# Patient Record
Sex: MALE | Race: WHITE | NOT HISPANIC OR LATINO | Employment: OTHER | URBAN - METROPOLITAN AREA
[De-identification: names, ages, dates, MRNs, and addresses within clinical notes are randomized per-mention and may not be internally consistent; named-entity substitution may affect disease eponyms.]

---

## 2019-05-12 ENCOUNTER — APPOINTMENT (OUTPATIENT)
Dept: CT IMAGING | Facility: HOSPITAL | Age: 39
End: 2019-05-12

## 2019-05-12 ENCOUNTER — HOSPITAL ENCOUNTER (OUTPATIENT)
Facility: HOSPITAL | Age: 39
Setting detail: OBSERVATION
Discharge: LEFT BEFORE DISCHARGE | End: 2019-05-14
Attending: EMERGENCY MEDICINE | Admitting: HOSPITALIST

## 2019-05-12 DIAGNOSIS — J03.90 TONSILLITIS: Primary | ICD-10-CM

## 2019-05-12 DIAGNOSIS — N28.9 ACUTE RENAL INSUFFICIENCY: ICD-10-CM

## 2019-05-12 DIAGNOSIS — R63.0 ANOREXIA: ICD-10-CM

## 2019-05-12 LAB
ANION GAP SERPL CALCULATED.3IONS-SCNC: 14.9 MMOL/L
BASOPHILS # BLD AUTO: 0.06 10*3/MM3 (ref 0–0.2)
BASOPHILS NFR BLD AUTO: 0.4 % (ref 0–1.5)
BUN BLD-MCNC: 9 MG/DL (ref 6–20)
BUN/CREAT SERPL: 6.4 (ref 7–25)
CALCIUM SPEC-SCNC: 8.6 MG/DL (ref 8.6–10.5)
CHLORIDE SERPL-SCNC: 98 MMOL/L (ref 98–107)
CO2 SERPL-SCNC: 26.1 MMOL/L (ref 22–29)
CREAT BLD-MCNC: 1.4 MG/DL (ref 0.76–1.27)
D-LACTATE SERPL-SCNC: 1.8 MMOL/L (ref 0.5–2)
DEPRECATED RDW RBC AUTO: 44 FL (ref 37–54)
EOSINOPHIL # BLD AUTO: 0.01 10*3/MM3 (ref 0–0.4)
EOSINOPHIL NFR BLD AUTO: 0.1 % (ref 0.3–6.2)
ERYTHROCYTE [DISTWIDTH] IN BLOOD BY AUTOMATED COUNT: 12.2 % (ref 12.3–15.4)
GFR SERPL CREATININE-BSD FRML MDRD: 56 ML/MIN/1.73
GLUCOSE BLD-MCNC: 86 MG/DL (ref 65–99)
HCT VFR BLD AUTO: 52.1 % (ref 37.5–51)
HETEROPH AB SER QL LA: NEGATIVE
HGB BLD-MCNC: 18.4 G/DL (ref 13–17.7)
IMM GRANULOCYTES # BLD AUTO: 0.09 10*3/MM3 (ref 0–0.05)
IMM GRANULOCYTES NFR BLD AUTO: 0.5 % (ref 0–0.5)
LYMPHOCYTES # BLD AUTO: 1.55 10*3/MM3 (ref 0.7–3.1)
LYMPHOCYTES NFR BLD AUTO: 9.2 % (ref 19.6–45.3)
MCH RBC QN AUTO: 34.3 PG (ref 26.6–33)
MCHC RBC AUTO-ENTMCNC: 35.3 G/DL (ref 31.5–35.7)
MCV RBC AUTO: 97.2 FL (ref 79–97)
MONOCYTES # BLD AUTO: 1.93 10*3/MM3 (ref 0.1–0.9)
MONOCYTES NFR BLD AUTO: 11.4 % (ref 5–12)
NEUTROPHILS # BLD AUTO: 13.27 10*3/MM3 (ref 1.7–7)
NEUTROPHILS NFR BLD AUTO: 78.4 % (ref 42.7–76)
NRBC BLD AUTO-RTO: 0 /100 WBC (ref 0–0.2)
PLATELET # BLD AUTO: 186 10*3/MM3 (ref 140–450)
PMV BLD AUTO: 11.4 FL (ref 6–12)
POTASSIUM BLD-SCNC: 3.6 MMOL/L (ref 3.5–5.2)
PROCALCITONIN SERPL-MCNC: 0.11 NG/ML (ref 0.1–0.25)
RBC # BLD AUTO: 5.36 10*6/MM3 (ref 4.14–5.8)
S PYO AG THROAT QL: NEGATIVE
SODIUM BLD-SCNC: 139 MMOL/L (ref 136–145)
WBC NRBC COR # BLD: 16.91 10*3/MM3 (ref 3.4–10.8)

## 2019-05-12 PROCEDURE — 25010000002 PIPERACILLIN SOD-TAZOBACTAM PER 1 G: Performed by: EMERGENCY MEDICINE

## 2019-05-12 PROCEDURE — 80048 BASIC METABOLIC PNL TOTAL CA: CPT | Performed by: EMERGENCY MEDICINE

## 2019-05-12 PROCEDURE — 96361 HYDRATE IV INFUSION ADD-ON: CPT

## 2019-05-12 PROCEDURE — 25010000002 DEXAMETHASONE PER 1 MG: Performed by: EMERGENCY MEDICINE

## 2019-05-12 PROCEDURE — 36415 COLL VENOUS BLD VENIPUNCTURE: CPT | Performed by: EMERGENCY MEDICINE

## 2019-05-12 PROCEDURE — 87081 CULTURE SCREEN ONLY: CPT | Performed by: EMERGENCY MEDICINE

## 2019-05-12 PROCEDURE — 87040 BLOOD CULTURE FOR BACTERIA: CPT | Performed by: EMERGENCY MEDICINE

## 2019-05-12 PROCEDURE — 83605 ASSAY OF LACTIC ACID: CPT | Performed by: EMERGENCY MEDICINE

## 2019-05-12 PROCEDURE — 86308 HETEROPHILE ANTIBODY SCREEN: CPT | Performed by: EMERGENCY MEDICINE

## 2019-05-12 PROCEDURE — 87880 STREP A ASSAY W/OPTIC: CPT | Performed by: EMERGENCY MEDICINE

## 2019-05-12 PROCEDURE — 25010000002 IOPAMIDOL 61 % SOLUTION: Performed by: EMERGENCY MEDICINE

## 2019-05-12 PROCEDURE — 96365 THER/PROPH/DIAG IV INF INIT: CPT

## 2019-05-12 PROCEDURE — 96375 TX/PRO/DX INJ NEW DRUG ADDON: CPT

## 2019-05-12 PROCEDURE — 84145 PROCALCITONIN (PCT): CPT | Performed by: EMERGENCY MEDICINE

## 2019-05-12 PROCEDURE — G0378 HOSPITAL OBSERVATION PER HR: HCPCS

## 2019-05-12 PROCEDURE — 99284 EMERGENCY DEPT VISIT MOD MDM: CPT

## 2019-05-12 PROCEDURE — 85025 COMPLETE CBC W/AUTO DIFF WBC: CPT | Performed by: EMERGENCY MEDICINE

## 2019-05-12 PROCEDURE — 70491 CT SOFT TISSUE NECK W/DYE: CPT

## 2019-05-12 RX ORDER — SODIUM CHLORIDE 9 MG/ML
75 INJECTION, SOLUTION INTRAVENOUS CONTINUOUS
Status: DISCONTINUED | OUTPATIENT
Start: 2019-05-12 | End: 2019-05-13

## 2019-05-12 RX ORDER — PANTOPRAZOLE SODIUM 40 MG/1
40 TABLET, DELAYED RELEASE ORAL
Status: DISCONTINUED | OUTPATIENT
Start: 2019-05-13 | End: 2019-05-14 | Stop reason: HOSPADM

## 2019-05-12 RX ADMIN — DEXAMETHASONE SODIUM PHOSPHATE 10 MG: 10 INJECTION INTRAMUSCULAR; INTRAVENOUS at 16:39

## 2019-05-12 RX ADMIN — TAZOBACTAM SODIUM AND PIPERACILLIN SODIUM 3.38 G: 375; 3 INJECTION, SOLUTION INTRAVENOUS at 17:37

## 2019-05-12 RX ADMIN — TAZOBACTAM SODIUM AND PIPERACILLIN SODIUM 3.38 G: 375; 3 INJECTION, SOLUTION INTRAVENOUS at 23:55

## 2019-05-12 RX ADMIN — SODIUM CHLORIDE 1000 ML: 9 INJECTION, SOLUTION INTRAVENOUS at 16:43

## 2019-05-12 RX ADMIN — IOPAMIDOL 75 ML: 612 INJECTION, SOLUTION INTRAVENOUS at 17:21

## 2019-05-12 RX ADMIN — SODIUM CHLORIDE 125 ML/HR: 9 INJECTION, SOLUTION INTRAVENOUS at 16:44

## 2019-05-12 NOTE — ED PROVIDER NOTES
EMERGENCY DEPARTMENT ENCOUNTER    CHIEF COMPLAINT  Chief Complaint: sore throat  History given by: patient and patient's wife  History limited by: none  Room Number: 18/18  PMD: System, Provider Not In      HPI:  Pt is a 38 y.o. male who presents complaining of sore throat that has been worsening for the past two days. Pt reports that he was seen at [facility] yesterday due to chills, diaphoresis, fever, and sore throat. He had a strep swap that was negative and he was advised to return if his Sx worsened. Today the pt began to develop nausea, vomiting, generalized myalgias, trouble swallowing (due to pain), and decreased appetite.  He has been eating and drinking very little the past 2 days.  It was also noted that while at the [facility] the pt was HTN. Pt says that he has not seen a doctor in nearly 6 years and has not been previously Dx with HTN. Denies cough, SOA, rashes, and any other complaints at this time. Pt denies smoking, but admits to occasional EtOH use. He was recently on a cruise and is unsure of ill contact. Pt took tylenol PTA.    Duration:  Two days  Onset: gradual  Timing: constant  Location: throat  Radiation: none  Quality: sore throat  Intensity/Severity: moderate  Progression: worsened  Associated Symptoms: chills, diaphoresis, fever, sore throat, nausea, vomiting, generalized myalgias, trouble swallowing (due to pain), and decreased appetite  Aggravating Factors: swallowing  Alleviating Factors: none  Previous Episodes: none  Treatment before arrival: Pt took tylenol PTA.     PAST MEDICAL HISTORY  Active Ambulatory Problems     Diagnosis Date Noted   • No Active Ambulatory Problems     Resolved Ambulatory Problems     Diagnosis Date Noted   • No Resolved Ambulatory Problems     No Additional Past Medical History       PAST SURGICAL HISTORY  History reviewed. No pertinent surgical history.    FAMILY HISTORY  History reviewed. No pertinent family history.    SOCIAL HISTORY  Social History     Socioeconomic  History   • Marital status:      Spouse name: Not on file   • Number of children: Not on file   • Years of education: Not on file   • Highest education level: Not on file   Tobacco Use   • Smoking status: Current Some Day Smoker     Types: Electronic Cigarette   Substance and Sexual Activity   • Alcohol use: Yes     Alcohol/week: 0.6 oz     Types: 1 Cans of beer per week   • Drug use: No   • Sexual activity: Defer       ALLERGIES  Patient has no known allergies.    REVIEW OF SYSTEMS  Review of Systems   Constitutional: Positive for appetite change (decreased), chills, diaphoresis and fever. Negative for activity change.   HENT: Positive for sore throat and trouble swallowing (due to pain). Negative for congestion.    Eyes: Negative.    Respiratory: Positive for shortness of breath. Negative for cough.    Cardiovascular: Negative for chest pain and leg swelling.        (+) HTN   Gastrointestinal: Positive for diarrhea and nausea. Negative for abdominal pain and vomiting.   Endocrine: Negative.    Genitourinary: Negative for decreased urine volume and dysuria.   Musculoskeletal: Positive for myalgias (generalized). Negative for neck pain.   Skin: Negative for rash and wound.   Allergic/Immunologic: Negative.    Neurological: Negative for weakness, numbness and headaches.   Hematological: Negative.    Psychiatric/Behavioral: Negative.    All other systems reviewed and are negative.      PHYSICAL EXAM  ED Triage Vitals   Temp Heart Rate Resp BP SpO2   05/12/19 1525 05/12/19 1526 05/12/19 1525 -- 05/12/19 1526   99.7 °F (37.6 °C) 98 18  96 %      Temp src Heart Rate Source Patient Position BP Location FiO2 (%)   05/12/19 1525 05/12/19 1526 -- -- --   Tympanic Monitor          Physical Exam   Constitutional: He is oriented to person, place, and time.  Non-toxic appearance. No distress.   HENT:   Head: Normocephalic and atraumatic.   Mouth/Throat: No trismus in the jaw. Oropharyngeal exudate present. No tonsillar  abscesses.   Patient has significantly enlarged palatine tonsils that almost meet in the midline.  The uvula is midline.  She has exudate on the left palate team tonsil greater than the right.  It is a mild amount of exudate.  Patient has no trismus.  Patient is tolerating oral secretions well.  Patient has no pain in moving the trachea or hyoid bone with palpation.   Eyes: EOM are normal. Pupils are equal, round, and reactive to light.   Neck: Normal range of motion. Neck supple. No tracheal tenderness present. No tracheal deviation present.   Pt is handling he secretions well.    Cardiovascular: Normal rate, regular rhythm, normal heart sounds and intact distal pulses.   HR is in the 90s to the low 100s.    Pulmonary/Chest: Effort normal and breath sounds normal. No stridor. No respiratory distress.   Abdominal: Soft. There is no tenderness. There is no rebound and no guarding.   Musculoskeletal: Normal range of motion. He exhibits no edema.   No BLE edema.   Lymphadenopathy:        Head (right side): Submandibular adenopathy present.        Head (left side): Submandibular adenopathy present.   Neurological: He is alert and oriented to person, place, and time. He has normal sensation and normal strength. No cranial nerve deficit.   Skin: Skin is warm and dry.   Psychiatric: Mood and affect normal.   Nursing note and vitals reviewed.      LAB RESULTS  Lab Results (last 24 hours)     Procedure Component Value Units Date/Time    CBC & Differential [[id number]] Collected:  05/12/19 1549    Specimen:  Blood Updated:  05/12/19 1603    Narrative:       The following orders were created for panel order CBC & Differential.  Procedure                               Abnormality         Status                     ---------                               -----------         ------                     CBC Auto Differential[[id number]]        Abnormal            Final result                 Please view results for these tests on the  "individual orders.    Basic Metabolic Panel [[id number]]  (Abnormal) Collected:  05/12/19 1549    Specimen:  Blood Updated:  05/12/19 1620     Glucose 86 mg/dL      BUN 9 mg/dL      Creatinine 1.40 mg/dL      Sodium 139 mmol/L      Potassium 3.6 mmol/L      Chloride 98 mmol/L      CO2 26.1 mmol/L      Calcium 8.6 mg/dL      eGFR Non African Amer 56 mL/min/1.73      BUN/Creatinine Ratio 6.4     Anion Gap 14.9 mmol/L     Narrative:       GFR Normal >60  Chronic Kidney Disease <60  Kidney Failure <15    Procalcitonin [[id number]]  (Normal) Collected:  05/12/19 1549    Specimen:  Blood Updated:  05/12/19 1626     Procalcitonin 0.11 ng/mL     Narrative:       As a Marker for Sepsis (Non-Neonates):   1. <0.5 ng/mL represents a low risk of severe sepsis and/or septic shock.  1. >2 ng/mL represents a high risk of severe sepsis and/or septic shock.    As a Marker for Lower Respiratory Tract Infections that require antibiotic therapy:  PCT on Admission     Antibiotic Therapy             6-12 Hrs later  > 0.5                Strongly Recommended            >0.25 - <0.5         Recommended  0.1 - 0.25           Discouraged                   Remeasure/reassess PCT  <0.1                 Strongly Discouraged          Remeasure/reassess PCT      As 28 day mortality risk marker: \"Change in Procalcitonin Result\" (> 80 % or <=80 %) if Day 0 (or Day 1) and Day 4 values are available. Refer to http://www.[employer]-pct-calculator.com/   Change in PCT <=80 %   A decrease of PCT levels below or equal to 80 % defines a positive change in PCT test result representing a higher risk for 28-day all-cause mortality of patients diagnosed with severe sepsis or septic shock.  Change in PCT > 80 %   A decrease of PCT levels of more than 80 % defines a negative change in PCT result representing a lower risk for 28-day all-cause mortality of patients diagnosed with severe sepsis or septic shock.                CBC Auto Differential [[id number]]  (Abnormal) " Collected:  05/12/19 1549    Specimen:  Blood Updated:  05/12/19 1603     WBC 16.91 10*3/mm3      RBC 5.36 10*6/mm3      Hemoglobin 18.4 g/dL      Hematocrit 52.1 %      MCV 97.2 fL      MCH 34.3 pg      MCHC 35.3 g/dL      RDW 12.2 %      RDW-SD 44.0 fl      MPV 11.4 fL      Platelets 186 10*3/mm3      Neutrophil % 78.4 %      Lymphocyte % 9.2 %      Monocyte % 11.4 %      Eosinophil % 0.1 %      Basophil % 0.4 %      Immature Grans % 0.5 %      Neutrophils, Absolute 13.27 10*3/mm3      Lymphocytes, Absolute 1.55 10*3/mm3      Monocytes, Absolute 1.93 10*3/mm3      Eosinophils, Absolute 0.01 10*3/mm3      Basophils, Absolute 0.06 10*3/mm3      Immature Grans, Absolute 0.09 10*3/mm3      nRBC 0.0 /100 WBC     Rapid Strep A Screen - Swab, Throat [[id number]]  (Normal) Collected:  05/12/19 1550    Specimen:  Swab from Throat Updated:  05/12/19 1604     Strep A Ag Negative    Beta Strep Culture, Throat - Swab, Throat [[id number]] Collected:  05/12/19 1550    Specimen:  Swab from Throat Updated:  05/12/19 1554    Lactic Acid, Plasma [[id number]]  (Normal) Collected:  05/12/19 1559    Specimen:  Blood Updated:  05/12/19 1625     Lactate 1.8 mmol/L     Mononucleosis Screen [[id number]]  (Normal) Collected:  05/12/19 1559    Specimen:  Blood Updated:  05/12/19 1643     Monospot Negative    Blood Culture - Blood, Arm, Left [[id number]] Collected:  05/12/19 1651    Specimen:  Blood from Arm, Left Updated:  05/12/19 1709    Blood Culture - Blood, Arm, Right [[id number]] Collected:  05/12/19 1659    Specimen:  Blood from Arm, Right Updated:  05/12/19 1709          I ordered the above labs and reviewed the results    RADIOLOGY  CT Soft Tissue Neck With Contrast    (Results Pending)        I ordered the above noted radiological studies. Interpreted by radiologist. Discussed with radiologist (Dr. Johnson). Reviewed by me in PACS.       PROCEDURES  Procedures      PROGRESS AND CONSULTS     1545- Ordered Decadron for pt sore throat, labs,  and CT soft tissue neck.     1611- Ordered Zosyn for pt infection.     1632- Ordered IVF.     1817- Placed call to [facility].     1824- BP- 165/98 HR- 98 Temp- 99.7 °F (37.6 °C) (Tympanic) O2 sat- 93%  Rechecked pt. Pt is resting comfortably. Pt is still handing his oral secretions well and reports some relief of his Sx. Notified pt of his lab and CT soft tissue neck. Discussed the plan to contact [facility] for pt admission. Pt understands and agrees with the plan, all questions answered.    1824- Discussed pt with Dr. Berman ([facility]) who agrees to admit the pt.     MEDICAL DECISION MAKING  Results were reviewed/discussed with the patient and they were also made aware of online access. Pt also made aware that some labs, such as cultures, will not be resulted during ER visit and follow up with PMD is necessary.     MDM  Number of Diagnoses or Management Options     Amount and/or Complexity of Data Reviewed  Clinical lab tests: ordered and reviewed (WBC 16.91)  Tests in the radiology section of CPT®: ordered and reviewed (CT soft tissue neck- Pt has submandibular adenopathy. His tonsils are enlarged with stranding. There appears to be tonsillitis, no epiglottitis, airway compromise, or retropharyngeal abscess. )  Discussion of test results with the performing providers: yes (Dr. Johnson (Radiologist))  Decide to obtain previous medical records or to obtain history from someone other than the patient: yes  Review and summarize past medical records: yes (No prior records available in EPIC)  Discuss the patient with other providers: yes (Dr. Berman ([facility]))  Independent visualization of images, tracings, or specimens: yes    Patient Progress  Patient progress: stable         DIAGNOSIS  Final diagnoses:   Tonsillitis   Anorexia   Acute renal insufficiency       DISPOSITION  ADMISSION    Discussed treatment plan and reason for admission with pt/family and admitting physician.  Pt/family voiced understanding of the plan for admission for  further testing/treatment as needed.     Latest Documented Vital Signs:  As of 6:48 PM  BP- 165/98 HR- 98 Temp- 99.7 °F (37.6 °C) (Tympanic) O2 sat- 93%    --  Documentation assistance provided by love Villalta for Dr. Azeem MD.  Information recorded by the scribe was done at my direction and has been verified and validated by me.     Chuy Villalta  05/12/19 1848       Bney Gann MD  05/12/19 1926

## 2019-05-13 LAB
ANION GAP SERPL CALCULATED.3IONS-SCNC: 13 MMOL/L
B PARAPERT DNA SPEC QL NAA+PROBE: NOT DETECTED
B PERT DNA SPEC QL NAA+PROBE: NOT DETECTED
BASOPHILS # BLD AUTO: 0.02 10*3/MM3 (ref 0–0.2)
BASOPHILS NFR BLD AUTO: 0.1 % (ref 0–1.5)
BUN BLD-MCNC: 9 MG/DL (ref 6–20)
BUN/CREAT SERPL: 8.6 (ref 7–25)
C PNEUM DNA NPH QL NAA+NON-PROBE: NOT DETECTED
CALCIUM SPEC-SCNC: 8.6 MG/DL (ref 8.6–10.5)
CHLORIDE SERPL-SCNC: 102 MMOL/L (ref 98–107)
CO2 SERPL-SCNC: 22 MMOL/L (ref 22–29)
CREAT BLD-MCNC: 1.05 MG/DL (ref 0.76–1.27)
CRP SERPL-MCNC: 4.66 MG/DL (ref 0–0.5)
DEPRECATED RDW RBC AUTO: 40.7 FL (ref 37–54)
EOSINOPHIL # BLD AUTO: 0 10*3/MM3 (ref 0–0.4)
EOSINOPHIL NFR BLD AUTO: 0 % (ref 0.3–6.2)
ERYTHROCYTE [DISTWIDTH] IN BLOOD BY AUTOMATED COUNT: 11.7 % (ref 12.3–15.4)
FLUAV H1 2009 PAND RNA NPH QL NAA+PROBE: NOT DETECTED
FLUAV H1 HA GENE NPH QL NAA+PROBE: NOT DETECTED
FLUAV H3 RNA NPH QL NAA+PROBE: NOT DETECTED
FLUAV SUBTYP SPEC NAA+PROBE: NOT DETECTED
FLUBV RNA ISLT QL NAA+PROBE: NOT DETECTED
GFR SERPL CREATININE-BSD FRML MDRD: 79 ML/MIN/1.73
GLUCOSE BLD-MCNC: 129 MG/DL (ref 65–99)
HADV DNA SPEC NAA+PROBE: NOT DETECTED
HCOV 229E RNA SPEC QL NAA+PROBE: NOT DETECTED
HCOV HKU1 RNA SPEC QL NAA+PROBE: NOT DETECTED
HCOV NL63 RNA SPEC QL NAA+PROBE: NOT DETECTED
HCOV OC43 RNA SPEC QL NAA+PROBE: NOT DETECTED
HCT VFR BLD AUTO: 46 % (ref 37.5–51)
HGB BLD-MCNC: 16.6 G/DL (ref 13–17.7)
HMPV RNA NPH QL NAA+NON-PROBE: NOT DETECTED
HPIV1 RNA SPEC QL NAA+PROBE: NOT DETECTED
HPIV2 RNA SPEC QL NAA+PROBE: NOT DETECTED
HPIV3 RNA NPH QL NAA+PROBE: NOT DETECTED
HPIV4 P GENE NPH QL NAA+PROBE: NOT DETECTED
IMM GRANULOCYTES # BLD AUTO: 0.07 10*3/MM3 (ref 0–0.05)
IMM GRANULOCYTES NFR BLD AUTO: 0.5 % (ref 0–0.5)
LYMPHOCYTES # BLD AUTO: 0.6 10*3/MM3 (ref 0.7–3.1)
LYMPHOCYTES NFR BLD AUTO: 4.3 % (ref 19.6–45.3)
M PNEUMO IGG SER IA-ACNC: NOT DETECTED
MCH RBC QN AUTO: 33.9 PG (ref 26.6–33)
MCHC RBC AUTO-ENTMCNC: 36.1 G/DL (ref 31.5–35.7)
MCV RBC AUTO: 94.1 FL (ref 79–97)
MONOCYTES # BLD AUTO: 0.58 10*3/MM3 (ref 0.1–0.9)
MONOCYTES NFR BLD AUTO: 4.2 % (ref 5–12)
NEUTROPHILS # BLD AUTO: 12.56 10*3/MM3 (ref 1.7–7)
NEUTROPHILS NFR BLD AUTO: 90.9 % (ref 42.7–76)
NRBC BLD AUTO-RTO: 0 /100 WBC (ref 0–0.2)
PLATELET # BLD AUTO: 192 10*3/MM3 (ref 140–450)
PMV BLD AUTO: 11.5 FL (ref 6–12)
POTASSIUM BLD-SCNC: 3.2 MMOL/L (ref 3.5–5.2)
RBC # BLD AUTO: 4.89 10*6/MM3 (ref 4.14–5.8)
RHINOVIRUS RNA SPEC NAA+PROBE: NOT DETECTED
RSV RNA NPH QL NAA+NON-PROBE: NOT DETECTED
SODIUM BLD-SCNC: 137 MMOL/L (ref 136–145)
WBC NRBC COR # BLD: 13.83 10*3/MM3 (ref 3.4–10.8)

## 2019-05-13 PROCEDURE — 96366 THER/PROPH/DIAG IV INF ADDON: CPT

## 2019-05-13 PROCEDURE — 96375 TX/PRO/DX INJ NEW DRUG ADDON: CPT

## 2019-05-13 PROCEDURE — 86140 C-REACTIVE PROTEIN: CPT | Performed by: HOSPITALIST

## 2019-05-13 PROCEDURE — 87581 M.PNEUMON DNA AMP PROBE: CPT | Performed by: HOSPITALIST

## 2019-05-13 PROCEDURE — 87633 RESP VIRUS 12-25 TARGETS: CPT | Performed by: HOSPITALIST

## 2019-05-13 PROCEDURE — 96376 TX/PRO/DX INJ SAME DRUG ADON: CPT

## 2019-05-13 PROCEDURE — 87798 DETECT AGENT NOS DNA AMP: CPT | Performed by: HOSPITALIST

## 2019-05-13 PROCEDURE — 25010000002 HYDRALAZINE PER 20 MG: Performed by: HOSPITALIST

## 2019-05-13 PROCEDURE — G0378 HOSPITAL OBSERVATION PER HR: HCPCS

## 2019-05-13 PROCEDURE — 87486 CHLMYD PNEUM DNA AMP PROBE: CPT | Performed by: HOSPITALIST

## 2019-05-13 PROCEDURE — 25810000003 SODIUM CHLORIDE 0.9 % WITH KCL 20 MEQ 20-0.9 MEQ/L-% SOLUTION: Performed by: HOSPITALIST

## 2019-05-13 PROCEDURE — 85025 COMPLETE CBC W/AUTO DIFF WBC: CPT | Performed by: HOSPITALIST

## 2019-05-13 PROCEDURE — 96361 HYDRATE IV INFUSION ADD-ON: CPT

## 2019-05-13 PROCEDURE — 80048 BASIC METABOLIC PNL TOTAL CA: CPT | Performed by: HOSPITALIST

## 2019-05-13 PROCEDURE — 25010000002 PIPERACILLIN SOD-TAZOBACTAM PER 1 G: Performed by: HOSPITALIST

## 2019-05-13 RX ORDER — AMLODIPINE BESYLATE 5 MG/1
5 TABLET ORAL DAILY
Status: DISCONTINUED | OUTPATIENT
Start: 2019-05-13 | End: 2019-05-13

## 2019-05-13 RX ORDER — SODIUM CHLORIDE AND POTASSIUM CHLORIDE 150; 900 MG/100ML; MG/100ML
75 INJECTION, SOLUTION INTRAVENOUS CONTINUOUS
Status: DISCONTINUED | OUTPATIENT
Start: 2019-05-13 | End: 2019-05-14

## 2019-05-13 RX ORDER — AMLODIPINE BESYLATE 10 MG/1
10 TABLET ORAL ONCE
Status: COMPLETED | OUTPATIENT
Start: 2019-05-14 | End: 2019-05-14

## 2019-05-13 RX ORDER — AMLODIPINE BESYLATE 10 MG/1
10 TABLET ORAL
Status: DISCONTINUED | OUTPATIENT
Start: 2019-05-14 | End: 2019-05-14 | Stop reason: HOSPADM

## 2019-05-13 RX ORDER — AMLODIPINE BESYLATE 10 MG/1
10 TABLET ORAL DAILY
Status: DISCONTINUED | OUTPATIENT
Start: 2019-05-13 | End: 2019-05-13

## 2019-05-13 RX ORDER — AMLODIPINE BESYLATE 10 MG/1
10 TABLET ORAL DAILY
Status: DISCONTINUED | OUTPATIENT
Start: 2019-05-14 | End: 2019-05-13

## 2019-05-13 RX ORDER — ONDANSETRON 2 MG/ML
4 INJECTION INTRAMUSCULAR; INTRAVENOUS EVERY 6 HOURS PRN
Status: DISCONTINUED | OUTPATIENT
Start: 2019-05-13 | End: 2019-05-14

## 2019-05-13 RX ORDER — HYDRALAZINE HYDROCHLORIDE 20 MG/ML
10 INJECTION INTRAMUSCULAR; INTRAVENOUS EVERY 4 HOURS PRN
Status: DISCONTINUED | OUTPATIENT
Start: 2019-05-13 | End: 2019-05-13

## 2019-05-13 RX ADMIN — AMLODIPINE BESYLATE 5 MG: 5 TABLET ORAL at 14:46

## 2019-05-13 RX ADMIN — TAZOBACTAM SODIUM AND PIPERACILLIN SODIUM 3.38 G: 375; 3 INJECTION, SOLUTION INTRAVENOUS at 23:26

## 2019-05-13 RX ADMIN — SODIUM CHLORIDE 125 ML/HR: 9 INJECTION, SOLUTION INTRAVENOUS at 01:46

## 2019-05-13 RX ADMIN — POTASSIUM CHLORIDE AND SODIUM CHLORIDE 75 ML/HR: 900; 150 INJECTION, SOLUTION INTRAVENOUS at 14:46

## 2019-05-13 RX ADMIN — HYDRALAZINE HYDROCHLORIDE 10 MG: 20 INJECTION INTRAMUSCULAR; INTRAVENOUS at 20:09

## 2019-05-13 RX ADMIN — TAZOBACTAM SODIUM AND PIPERACILLIN SODIUM 3.38 G: 375; 3 INJECTION, SOLUTION INTRAVENOUS at 16:39

## 2019-05-13 RX ADMIN — HYDRALAZINE HYDROCHLORIDE 10 MG: 20 INJECTION INTRAMUSCULAR; INTRAVENOUS at 08:56

## 2019-05-13 RX ADMIN — SODIUM CHLORIDE 125 ML/HR: 9 INJECTION, SOLUTION INTRAVENOUS at 08:57

## 2019-05-13 RX ADMIN — TAZOBACTAM SODIUM AND PIPERACILLIN SODIUM 3.38 G: 375; 3 INJECTION, SOLUTION INTRAVENOUS at 09:56

## 2019-05-13 RX ADMIN — PANTOPRAZOLE SODIUM 40 MG: 40 TABLET, DELAYED RELEASE ORAL at 05:01

## 2019-05-13 NOTE — PLAN OF CARE
Problem: Patient Care Overview  Goal: Plan of Care Review  Outcome: Ongoing (interventions implemented as appropriate)   05/13/19 1322   Coping/Psychosocial   Plan of Care Reviewed With patient   Plan of Care Review   Progress improving   OTHER   Outcome Summary IVF NS with 20 kcl at 75 cc/hr; iv abx per order; advance diet to soft regular tolerating; HTN prn BP meds given; norvasc scheduled; up ad erendira Room air, NSR on cardiac monitor       Problem: Pain, Acute (Adult)  Goal: Identify Related Risk Factors and Signs and Symptoms  Outcome: Ongoing (interventions implemented as appropriate)   05/13/19 1322   Pain, Acute (Adult)   Related Risk Factors (Acute Pain) disease process   Signs and Symptoms (Acute Pain) verbalization of pain descriptors     Goal: Acceptable Pain Control/Comfort Level  Outcome: Ongoing (interventions implemented as appropriate)   05/13/19 1322   Pain, Acute (Adult)   Acceptable Pain Control/Comfort Level making progress toward outcome       Problem: Infection, Risk/Actual (Adult)  Goal: Identify Related Risk Factors and Signs and Symptoms  Outcome: Ongoing (interventions implemented as appropriate)   05/13/19 1322   Infection, Risk/Actual (Adult)   Related Risk Factors (Infection, Risk/Actual) other (see comments)  (current infection)   Signs and Symptoms (Infection, Risk/Actual) (pain with swallowing)     Goal: Infection Prevention/Resolution  Outcome: Ongoing (interventions implemented as appropriate)   05/13/19 1322   Infection, Risk/Actual (Adult)   Infection Prevention/Resolution making progress toward outcome

## 2019-05-13 NOTE — PROGRESS NOTES
Discharge Planning Assessment  [facility]     Patient Name: Efren Guillen  MRN: [id number]  Today's Date: 5/13/2019    Admit Date: 5/12/2019    Discharge Needs Assessment     Row Name 05/13/19 1637       Living Environment    Lives With  significant other    Name(s) of Who Lives With Patient  Cherri lucia     Current Living Arrangements  home/apartment/condo    Primary Care Provided by  self    Provides Primary Care For  no one    Family Caregiver if Needed  significant other    Family Caregiver Names  Cherri lucia     Quality of Family Relationships  supportive    Able to Return to Prior Arrangements  yes       Resource/Environmental Concerns    Resource/Environmental Concerns  none    Transportation Concerns  car, none       Transition Planning    Patient/Family Anticipates Transition to  home    Patient/Family Anticipated Services at Transition  none    Transportation Anticipated  family or friend will provide       Discharge Needs Assessment    Readmission Within the Last 30 Days  no previous admission in last 30 days    Concerns to be Addressed  no discharge needs identified    Equipment Currently Used at Home  none    Anticipated Changes Related to Illness  none    Equipment Needed After Discharge  none    Offered/Gave Vendor List  no        Discharge Plan     Row Name 05/13/19 7284       Plan    Plan  Home    Patient/Family in Agreement with Plan  yes    Plan Comments  Met with patient and Cherri araya at bedside, patient granted me permission to speak with him while marshal remains in the room.  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   Patient does not use any special or adaptive equipment.  Patient uses the [facility] [address], denies issues obtaining, affording or taking his medications. Offered Meds to Beds, patient declined offer. Patient does not have POA or living will. Discharged  plans discussed plan is to return home, denies any special needs at this time. Family or friends will transport. Will continue to monitor for new or changing discharge plans.        Destination      No service coordination in this encounter.      Durable Medical Equipment      No service coordination in this encounter.      Dialysis/Infusion      No service coordination in this encounter.      Home Medical Care      No service coordination in this encounter.      Therapy      No service coordination in this encounter.      Community Resources      No service coordination in this encounter.          Demographic Summary     Row Name 05/13/19 1636       General Information    Admission Type  observation    Arrived From  emergency department    Referral Source  admission list    Reason for Consult  discharge planning    Preferred Language  English     Used During This Interaction  no       Contact Information    Permission Granted to Share Info With  family/designee Cherribrenda aj        Functional Status     Row Name 05/13/19 1636       Functional Status    Usual Activity Tolerance  excellent    Current Activity Tolerance  good       Functional Status, IADL    Medications  independent    Meal Preparation  independent       Mental Status    General Appearance WDL  WDL       Mental Status Summary    Recent Changes in Mental Status/Cognitive Functioning  no changes       Employment/    Employment Status  self-employed        Psychosocial    No documentation.       Abuse/Neglect    No documentation.       Legal    No documentation.       Substance Abuse    No documentation.       Patient Forms    No documentation.           Jeannie Alaniz RN

## 2019-05-13 NOTE — PLAN OF CARE
Problem: Patient Care Overview  Goal: Plan of Care Review  Outcome: Ongoing (interventions implemented as appropriate)   05/13/19 1115   Coping/Psychosocial   Plan of Care Reviewed With patient   Plan of Care Review   Progress improving   OTHER   Outcome Summary Patient newly admitted with tonsilitis overnight. Tonsils are red and swollen. Patient does c/o sore throat. IVF's started. IV abx given as ordered. ENT consulted to see today. Labs for the morning. Will continue to monitor.        Problem: Pain, Acute (Adult)  Goal: Acceptable Pain Control/Comfort Level  Outcome: Ongoing (interventions implemented as appropriate)

## 2019-05-13 NOTE — PROGRESS NOTES
"Pharmacokinetic Consult - Zosyn Dosing  Efren Guillen is a 38 y.o. male who is on day 1 pharmacy to dose Zosyn for tonsillitis.  Pharmacy dosing Zosyn per Dr. Berman's request.     Relevant clinical data and objective history reviewed:  177.8 cm (70\")  92.2 kg (203 lb 4.8 oz)  Body mass index is 29.17 kg/m².   He has no past medical history on file.  Allergies as of 05/12/2019   • (No Known Allergies)     Vital Signs (last 24 hours)         05/11 0700  -  05/12 0659 05/12 0700  -  05/12 2128   Most Recent    Temp (°F)   98 -  99.7     98 (36.7)    Heart Rate   87 -  99     87    Resp   16 -  18     18    BP   165/98 -  (!) 188/119     169/99    SpO2 (%)   93 -  98     98            Estimated Creatinine Clearance: 80.9 mL/min (A) (by C-G formula based on SCr of 1.4 mg/dL (H)).  Results from last 7 days   Lab Units 05/12/19  1549   CREATININE mg/dL 1.40*     Results from last 7 days   Lab Units 05/12/19  1549   WBC 10*3/mm3 16.91*       Anti-Infectives (From admission, onward)      Ordered     Dose/Rate Route Frequency Start Stop    05/12/19 2127  piperacillin-tazobactam (ZOSYN) 3.375 g in iso-osmotic dextrose 50 ml (premix)     Ordering Provider:  Giovany Berman MD    3.375 g  over 4 Hours Intravenous Every 8 Hours 05/13/19 0000 05/15/19 2359    05/12/19 1611  piperacillin-tazobactam (ZOSYN) 3.375 g in iso-osmotic dextrose 50 ml (premix)     Ordering Provider:  Beny Gann MD    3.375 g  over 30 Minutes Intravenous Once 05/12/19 1613 05/12/19 1813           Assessment/Plan  Zosyn 3.375 grams iv every 8 hours as per [facility] dosing guidelines as estimated CrCl 80 at this time  Pharmacy will continue to follow daily while on Zosyn and adjust as needed.     Lu Romano, PharmD, BCIDP, BCPS      "

## 2019-05-13 NOTE — CONSULTS
[facility]   ENT CONSULT  2019      Patient Identification:  Name: Efren Guillen  Age: 38 y.o.  Sex: male  :  1980  MRN: [id number]                     Date of Admission: 2019      CC:  Sore throat      HPI: Patient is a 38-year-old male with 3 to 4 days of progressive sore throat and difficulty swallowing.  He has had fever and chills at home with sweats and diarrhea.  He returned from a cruise the week before.  He continued to feel bad and presented to the emergency room yesterday.  He was noted to be dehydrated and IV fluids have been instituted.  Neck CT shows no evidence of abscess but significant swelling of the tonsils.  Patient denies having chronic recurrent tonsil infections, enlargement or sleep problems.  His blood pressure has been significantly elevated.  He has not seen a physician in 6 or 7 years.    ROS:  Review of Systems - History obtained from chart review and the patient     Past Medical History:  History reviewed. No pertinent past medical history.    Past Surgical History:  Past Surgical History:   Procedure Laterality Date   • HERNIA REPAIR      mesh   • SPINE SURGERY      s1 disectomy       Social History:  Social History     Socioeconomic History   • Marital status:      Spouse name: Not on file   • Number of children: Not on file   • Years of education: Not on file   • Highest education level: Not on file   Tobacco Use   • Smoking status: Former Smoker     Types: Electronic Cigarette   • Smokeless tobacco: Never Used   • Tobacco comment: 7-8 years ago   Substance and Sexual Activity   • Alcohol use: Yes     Alcohol/week: 0.6 oz     Types: 1 Cans of beer per week     Comment: occassional   • Drug use: No   • Sexual activity: Defer       Family History:  Family History   Problem Relation Age of Onset   • Hypertension Mother    • Hypertension Father    • Stroke Maternal Grandmother    • Diabetes Niece        Home Meds:  No medications prior  to admission.       Allergies:  No Known Allergies    Immunization Status:    There is no immunization history on file for this patient.      PE:   Temp:  [97.7 °F (36.5 °C)-99.7 °F (37.6 °C)] 97.8 °F (36.6 °C)  Heart Rate:  [87-99] 87  Resp:  [16-18] 18  BP: (160-188)/() 166/106     Body mass index is 29.17 kg/m².      General appearance: alert, well appearing, and in no distress, oriented to person, place, and time and normal appearing weight. No drooling or respiratory distress.  Ability to Communicate: normal means of communication, clear voice, normal hearing  Ears - bilateral TM's and external ear canals normal.  Nasal exam - normal and patent, no erythema, discharge or polyps, clear rhinorrhea, sinuses normal and nontender and straight septum.  Oropharyngeal exam - mucous membranes moist, pharynx normal without lesions, tonsils hypertrophied with exudate and dental hygiene good.  Neck exam - bilateral symmetric anterior adenopathy.  Neurological exam reveals alert, oriented, normal speech, no focal findings or movement disorder noted, cranial nerves II through XII intact.      MEDICATIONS     Current Facility-Administered Medications   Medication Dose Route Frequency Provider Last Rate Last Dose   • hydrALAZINE (APRESOLINE) injection 10 mg  10 mg Intravenous Q4H PRN Giovany Berman MD   10 mg at 05/13/19 0856   • pantoprazole (PROTONIX) EC tablet 40 mg  40 mg Oral Q AM Giovany Berman MD   40 mg at 05/13/19 0501   • piperacillin-tazobactam (ZOSYN) 3.375 g in iso-osmotic dextrose 50 ml (premix)  3.375 g Intravenous Q8H Giovany Berman MD 0 mL/hr at 05/13/19 0343 3.375 g at 05/13/19 0956   • sodium chloride 0.9 % infusion  125 mL/hr Intravenous Continuous Beny Gann  mL/hr at 05/13/19 0857 125 mL/hr at 05/13/19 0857         DATA         Intake/Output Summary (Last 24 hours) at 5/13/2019 1154  Last data filed at 5/13/2019 0900  Gross per 24 hour   Intake 3570 ml   Output --   Net 3570 ml       CBC:    Results from last 7 days   Lab Units 05/13/19  0456   WBC 10*3/mm3 13.83*   RBC 10*6/mm3 4.89     BMP:   Results from last 7 days   Lab Units 05/13/19  0456   GLUCOSE mg/dL 129*   CO2 mmol/L 22.0   BUN mg/dL 9   CREATININE mg/dL 1.05   CALCIUM mg/dL 8.6     Radiology review: No evidence of acute sinusitis.  Tonsils moderately enlarged symmetrically with bilateral cervical lymphadenopathy, no evidence of peritonsillar abscess or neck abscess        Imaging Results (all)     Procedure Component Value Units Date/Time    CT Soft Tissue Neck With Contrast [[id number]] Collected:  05/12/19 1931     Updated:  05/12/19 1931    Narrative:       EMERGENCY CONTRAST-ENHANCED CT SCAN OF THE NECK 05/12/2019     CLINICAL HISTORY: Sore throat and fever.     TECHNIQUE: Spiral CT images were obtained from the tops of petrous  apices down through the sternoclavicular junction following intravenous  contrast and images were reformatted and submitted in 3 mm thick axial  CT sections with soft tissue algorithm, 2 mm thick sagittal and coronal  reconstructions were performed and submitted in soft tissue algorithm.     There are no prior studies from  for  comparison.     FINDINGS: There is a small 20 x 12 x 15 mm mucous retention cyst in the  inferior left maxillary sinus and a 10 x 10 mm mucus retention cyst in  inferior right maxillary sinus. The remainder of the visualized  paranasal sinuses and mastoid air cells and middle ear cavities are  clear. The posterior fossa structures are unremarkable. The nasopharynx  is normal in appearance. There is mild prominence in size, palatine  tonsils. There is slight increased enhancement in the palatine and  lingual tonsils, may be mildly inflamed but there is no abscess.  Otherwise the oropharynx is normal in appearance. There is a right  lateral pharyngeal diverticulum measuring 12 x 12 mm in size that abuts  the right hyoid bone. The remainder of the hypopharynx true  cord  subglottic airway are normal in appearance. The lung apices are clear.  The parotid,  parapharyngeal spaces are symmetric and normal  in appearance. The submandibular glands are normal in appearance. There  is lymphadenopathy in the neck with multiple enlarged suprahyoid level 2  bilateral jugulodigastric chain lymph nodes. The largest left  jugulodigastric chain lymph node measures up to 22 x 23 mm in transverse  dimension. The largest right jugulodigastric chain lymph node measures  19 x 15 mm. Cervical spine is unremarkable.       Impression:       Prominent size palatine-falcial tonsils that have increased  enhancement within them suggesting they are mildly inflamed. No  tonsillar abscess is seen and there is lymphadenopathy in neck with  multiple enlarged bilateral level 2 suprahyoid jugulodigastric chain  lymph nodes. The largest single lymph node in this left suprahyoid  jugulodigastric chain measures up to 2.3 x 2.2 x 3.5 cm in size. These  are probably reactive lymph nodes and likely be neoplastic especially  given the inflamed tonsils but of coarse the findings need to be  correlated clinically and with serial clinical exam. The results of this  study were communicated to Dr. Ledesma in the emergency room by  telephone 05/12/2019 at 6:15 PM.     Radiation dose reduction techniques were utilized, including automated  exposure control and exposure modulation based on body size.                  IMPRESSION     Tonsillitis       Since admission he feels better receiving IV fluids, antibiotics and a one-time dose of Decadron.  He is able to tolerate an oral diet at this time.  There is no evidence of abscess involving the tonsils or within the neck.  I would continue with IV fluids and antibiotics another 24 hours and discharge on oral antibiotic such as Augmentin.  No specific ENT follow-up required at discharge unless symptoms return or progress.        Shakir Parks MD  5/13/2019  11:54  AM

## 2019-05-13 NOTE — H&P
History and physical    Primary care physician  None    Chief complaint  Sore throat cough congestion nausea for last 2 days    History of present illness  38-year-old white male with no medical problems no home medication presented to [facility] emergency room with sore throat fever nausea cough congestion for last to 3 days which is getting worse and developed difficulty swallowing food.  Patient has evaluated in urgent care center due to chills diaphoresis and was advised to return if symptoms worse.  Patient symptoms got worse with decreased appetite due to pain and trouble swallowing and developed general myalgias and evaluated in ER found to have acute severe tonsillitis with dehydration admitted for management    PAST MEDICAL HISTORY  Hypertension     PAST SURGICAL HISTORY  Surgical History   History reviewed. No pertinent surgical history.     FAMILY HISTORY  History reviewed. No pertinent family history.     SOCIAL HISTORY  Social History   Social History            Socioeconomic History   • Marital status:        Spouse name: Not on file   • Number of children: Not on file   • Years of education: Not on file   • Highest education level: Not on file   Tobacco Use   • Smoking status: Current Some Day Smoker       Types: Electronic Cigarette   Substance and Sexual Activity   • Alcohol use: Yes       Alcohol/week: 0.6 oz       Types: 1 Cans of beer per week   • Drug use: No   • Sexual activity: Defer         ALLERGIES  Patient has no known allergies.  No home medication     REVIEW OF SYSTEMS  Review of Systems   Constitutional: Positive for appetite change (decreased), chills, diaphoresis and fever. Negative for activity change.   HENT: Positive for sore throat and trouble swallowing (due to pain). Negative for congestion.    Eyes: Negative.    Respiratory: Positive for shortness of breath. Negative for cough.    Cardiovascular: Negative for chest pain and leg swelling.        (+) HTN  "  Gastrointestinal: Positive for diarrhea and nausea. Negative for abdominal pain and vomiting.   Endocrine: Negative.    Genitourinary: Negative for decreased urine volume and dysuria.   Musculoskeletal: Positive for myalgias (generalized). Negative for neck pain.   Skin: Negative for rash and wound.   Allergic/Immunologic: Negative.    Neurological: Negative for weakness, numbness and headaches.   Hematological: Negative.    Psychiatric/Behavioral: Negative.    All other systems reviewed and are negative.     PHYSICAL EXAM  Blood pressure 143/93, pulse 87, temperature 97.8 °F (36.6 °C), temperature source Oral, resp. rate 18, height 177.8 cm (70\"), weight 92.2 kg (203 lb 4.8 oz), SpO2 98 %.    Constitutional: He is oriented to person, place, and time.  Non-toxic appearance. No distress.   Head: Normocephalic and atraumatic.   Mouth/Throat: No trismus in the jaw. Oropharyngeal exudate present. No tonsillar abscesses.   Patient has significantly enlarged palatine tonsils that almost meet in the midline.  The uvula is midline.  She has exudate on the left palate team tonsil greater than the right.  It is a mild amount of exudate.  Patient has no trismus.  Patient is tolerating oral secretions well.  Patient has no pain in moving the trachea or hyoid bone with palpation.   Eyes: EOM are normal. Pupils are equal, round, and reactive to light.   Neck: Normal range of motion. Neck supple. No tracheal tenderness present. No tracheal deviation present.   Cardiovascular: Normal rate, regular rhythm, normal heart sounds and intact distal pulses.   Pulmonary/Chest: Effort normal and breath sounds normal. No stridor. No respiratory distress.   Abdominal: Soft. There is no tenderness. There is no rebound and no guarding.   Musculoskeletal: Normal range of motion. He exhibits no edema.    Lymphadenopathy: Head (right side): Submandibular adenopathy present. Head (left side): Submandibular adenopathy present.   Neurological: He " is alert and oriented to person, place, and time. He has normal sensation and normal strength. No cranial nerve deficit.   Skin: Skin is warm and dry.   Psychiatric: Mood and affect normal.     LAB RESULTS  Lab Results (last 24 hours)     Procedure Component Value Units Date/Time    Basic Metabolic Panel [[id number]]  (Abnormal) Collected:  05/13/19 0456    Specimen:  Blood Updated:  05/13/19 0559     Glucose 129 mg/dL      BUN 9 mg/dL      Creatinine 1.05 mg/dL      Sodium 137 mmol/L      Potassium 3.2 mmol/L      Chloride 102 mmol/L      CO2 22.0 mmol/L      Calcium 8.6 mg/dL      eGFR Non African Amer 79 mL/min/1.73      BUN/Creatinine Ratio 8.6     Anion Gap 13.0 mmol/L     Narrative:       GFR Normal >60  Chronic Kidney Disease <60  Kidney Failure <15    CBC & Differential [[id number]] Collected:  05/13/19 0456    Specimen:  Blood Updated:  05/13/19 0530    Narrative:       The following orders were created for panel order CBC & Differential.  Procedure                               Abnormality         Status                     ---------                               -----------         ------                     CBC Auto Differential[[id number]]        Abnormal            Final result                 Please view results for these tests on the individual orders.    CBC Auto Differential [[id number]]  (Abnormal) Collected:  05/13/19 0456    Specimen:  Blood Updated:  05/13/19 0530     WBC 13.83 10*3/mm3      RBC 4.89 10*6/mm3      Hemoglobin 16.6 g/dL      Hematocrit 46.0 %      MCV 94.1 fL      MCH 33.9 pg      MCHC 36.1 g/dL      RDW 11.7 %      RDW-SD 40.7 fl      MPV 11.5 fL      Platelets 192 10*3/mm3      Neutrophil % 90.9 %      Lymphocyte % 4.3 %      Monocyte % 4.2 %      Eosinophil % 0.0 %      Basophil % 0.1 %      Immature Grans % 0.5 %      Neutrophils, Absolute 12.56 10*3/mm3      Lymphocytes, Absolute 0.60 10*3/mm3      Monocytes, Absolute 0.58 10*3/mm3      Eosinophils, Absolute 0.00 10*3/mm3       "Basophils, Absolute 0.02 10*3/mm3      Immature Grans, Absolute 0.07 10*3/mm3      nRBC 0.0 /100 WBC     Blood Culture - Blood, Arm, Left [[id number]] Collected:  05/12/19 1651    Specimen:  Blood from Arm, Left Updated:  05/12/19 1709    Blood Culture - Blood, Arm, Right [[id number]] Collected:  05/12/19 1659    Specimen:  Blood from Arm, Right Updated:  05/12/19 1709    Mononucleosis Screen [[id number]]  (Normal) Collected:  05/12/19 1559    Specimen:  Blood Updated:  05/12/19 1643     Monospot Negative    Procalcitonin [[id number]]  (Normal) Collected:  05/12/19 1549    Specimen:  Blood Updated:  05/12/19 1626     Procalcitonin 0.11 ng/mL     Narrative:       As a Marker for Sepsis (Non-Neonates):   1. <0.5 ng/mL represents a low risk of severe sepsis and/or septic shock.  1. >2 ng/mL represents a high risk of severe sepsis and/or septic shock.    As a Marker for Lower Respiratory Tract Infections that require antibiotic therapy:  PCT on Admission     Antibiotic Therapy             6-12 Hrs later  > 0.5                Strongly Recommended            >0.25 - <0.5         Recommended  0.1 - 0.25           Discouraged                   Remeasure/reassess PCT  <0.1                 Strongly Discouraged          Remeasure/reassess PCT      As 28 day mortality risk marker: \"Change in Procalcitonin Result\" (> 80 % or <=80 %) if Day 0 (or Day 1) and Day 4 values are available. Refer to http://[email or web address]-pct-[email or web address]/   Change in PCT <=80 %   A decrease of PCT levels below or equal to 80 % defines a positive change in PCT test result representing a higher risk for 28-day all-cause mortality of patients diagnosed with severe sepsis or septic shock.  Change in PCT > 80 %   A decrease of PCT levels of more than 80 % defines a negative change in PCT result representing a lower risk for 28-day all-cause mortality of patients diagnosed with severe sepsis or septic shock.                Lactic Acid, Plasma [[id number]]  (Normal) " Collected:  05/12/19 1559    Specimen:  Blood Updated:  05/12/19 1625     Lactate 1.8 mmol/L     Basic Metabolic Panel [[id number]]  (Abnormal) Collected:  05/12/19 1549    Specimen:  Blood Updated:  05/12/19 1620     Glucose 86 mg/dL      BUN 9 mg/dL      Creatinine 1.40 mg/dL      Sodium 139 mmol/L      Potassium 3.6 mmol/L      Chloride 98 mmol/L      CO2 26.1 mmol/L      Calcium 8.6 mg/dL      eGFR Non African Amer 56 mL/min/1.73      BUN/Creatinine Ratio 6.4     Anion Gap 14.9 mmol/L     Narrative:       GFR Normal >60  Chronic Kidney Disease <60  Kidney Failure <15    Rapid Strep A Screen - Swab, Throat [[id number]]  (Normal) Collected:  05/12/19 1550    Specimen:  Swab from Throat Updated:  05/12/19 1604     Strep A Ag Negative    CBC & Differential [[id number]] Collected:  05/12/19 1549    Specimen:  Blood Updated:  05/12/19 1603    Narrative:       The following orders were created for panel order CBC & Differential.  Procedure                               Abnormality         Status                     ---------                               -----------         ------                     CBC Auto Differential[[id number]]        Abnormal            Final result                 Please view results for these tests on the individual orders.    CBC Auto Differential [[id number]]  (Abnormal) Collected:  05/12/19 1549    Specimen:  Blood Updated:  05/12/19 1603     WBC 16.91 10*3/mm3      RBC 5.36 10*6/mm3      Hemoglobin 18.4 g/dL      Hematocrit 52.1 %      MCV 97.2 fL      MCH 34.3 pg      MCHC 35.3 g/dL      RDW 12.2 %      RDW-SD 44.0 fl      MPV 11.4 fL      Platelets 186 10*3/mm3      Neutrophil % 78.4 %      Lymphocyte % 9.2 %      Monocyte % 11.4 %      Eosinophil % 0.1 %      Basophil % 0.4 %      Immature Grans % 0.5 %      Neutrophils, Absolute 13.27 10*3/mm3      Lymphocytes, Absolute 1.55 10*3/mm3      Monocytes, Absolute 1.93 10*3/mm3      Eosinophils, Absolute 0.01 10*3/mm3      Basophils, Absolute  0.06 10*3/mm3      Immature Grans, Absolute 0.09 10*3/mm3      nRBC 0.0 /100 WBC     Beta Strep Culture, Throat - Swab, Throat [[id number]] Collected:  05/12/19 1550    Specimen:  Swab from Throat Updated:  05/12/19 1554        Imaging Results (last 24 hours)     Procedure Component Value Units Date/Time    CT Soft Tissue Neck With Contrast [[id number]] Collected:  05/12/19 1931     Updated:  05/12/19 1931    Narrative:       EMERGENCY CONTRAST-ENHANCED CT SCAN OF THE NECK 05/12/2019     CLINICAL HISTORY: Sore throat and fever.     TECHNIQUE: Spiral CT images were obtained from the tops of petrous  apices down through the sternoclavicular junction following intravenous  contrast and images were reformatted and submitted in 3 mm thick axial  CT sections with soft tissue algorithm, 2 mm thick sagittal and coronal  reconstructions were performed and submitted in soft tissue algorithm.     There are no prior studies from [facility] for  comparison.     FINDINGS: There is a small 20 x 12 x 15 mm mucous retention cyst in the  inferior left maxillary sinus and a 10 x 10 mm mucus retention cyst in  inferior right maxillary sinus. The remainder of the visualized  paranasal sinuses and mastoid air cells and middle ear cavities are  clear. The posterior fossa structures are unremarkable. The nasopharynx  is normal in appearance. There is mild prominence in size, palatine  tonsils. There is slight increased enhancement in the palatine and  lingual tonsils, may be mildly inflamed but there is no abscess.  Otherwise the oropharynx is normal in appearance. There is a right  lateral pharyngeal diverticulum measuring 12 x 12 mm in size that abuts  the right hyoid bone. The remainder of the hypopharynx true cord  subglottic airway are normal in appearance. The lung apices are clear.  The parotid,  parapharyngeal spaces are symmetric and normal  in appearance. The submandibular glands are normal in appearance.  There  is lymphadenopathy in the neck with multiple enlarged suprahyoid level 2  bilateral jugulodigastric chain lymph nodes. The largest left  jugulodigastric chain lymph node measures up to 22 x 23 mm in transverse  dimension. The largest right jugulodigastric chain lymph node measures  19 x 15 mm. Cervical spine is unremarkable.       Impression:       Prominent size palatine-falcial tonsils that have increased  enhancement within them suggesting they are mildly inflamed. No  tonsillar abscess is seen and there is lymphadenopathy in neck with  multiple enlarged bilateral level 2 suprahyoid jugulodigastric chain  lymph nodes. The largest single lymph node in this left suprahyoid  jugulodigastric chain measures up to 2.3 x 2.2 x 3.5 cm in size. These  are probably reactive lymph nodes and likely be neoplastic especially  given the inflamed tonsils but of coarse the findings need to be  correlated clinically and with serial clinical exam. The results of this  study were communicated to Dr. Ledesma in the emergency room by  telephone 05/12/2019 at 6:15 PM.     Radiation dose reduction techniques were utilized, including automated  exposure control and exposure modulation based on body size.                Current Facility-Administered Medications:   •  amLODIPine (NORVASC) tablet 10 mg, 10 mg, Oral, Daily, Giovany Berman MD  •  hydrALAZINE (APRESOLINE) injection 10 mg, 10 mg, Intravenous, Q4H PRN, Giovany Berman MD, 10 mg at 05/13/19 0856  •  ondansetron (ZOFRAN) injection 4 mg, 4 mg, Intravenous, Q6H PRN, Giovany Berman MD  •  pantoprazole (PROTONIX) EC tablet 40 mg, 40 mg, Oral, Q AM, Giovany Berman MD, 40 mg at 05/13/19 0501  •  piperacillin-tazobactam (ZOSYN) 3.375 g in iso-osmotic dextrose 50 ml (premix), 3.375 g, Intravenous, Q8H, Giovany Berman MD, Last Rate: 0 mL/hr at 05/13/19 0343, 3.375 g at 05/13/19 1208  •  sodium chloride 0.9 % infusion, 75 mL/hr, Intravenous, Continuous, Giovany Berman MD, Last Rate: 75  mL/hr at 05/13/19 1245, 75 mL/hr at 05/13/19 1245     ASSESSMENT  Acute severe tonsillitis without abscess  Dehydration  Dysphagia and odynophagia  Hypertension  Gastroesophageal reflux disease    PLAN  Admit  IV fluid  IV antibiotics  ENT consult  Norvasc  Protonix  Clear liquid diet and advance as tolerated  Steroids as needed  Stress ulcer DVT prophylaxis  Supportive care  Discussed with family  Patient is full code  Follow closely further recommendation according to hospital course    JEANNIE BARNARD MD

## 2019-05-14 VITALS
DIASTOLIC BLOOD PRESSURE: 102 MMHG | HEIGHT: 70 IN | TEMPERATURE: 98.5 F | SYSTOLIC BLOOD PRESSURE: 152 MMHG | RESPIRATION RATE: 18 BRPM | OXYGEN SATURATION: 93 % | BODY MASS INDEX: 15.92 KG/M2 | WEIGHT: 111.2 LBS | HEART RATE: 102 BPM

## 2019-05-14 LAB
ALBUMIN SERPL-MCNC: 3.6 G/DL (ref 3.5–5.2)
ALBUMIN/GLOB SERPL: 1.1 G/DL
ALP SERPL-CCNC: 46 U/L (ref 39–117)
ALT SERPL W P-5'-P-CCNC: 21 U/L (ref 1–41)
ANION GAP SERPL CALCULATED.3IONS-SCNC: 10.5 MMOL/L
AST SERPL-CCNC: 15 U/L (ref 1–40)
BACTERIA SPEC AEROBE CULT: NORMAL
BASOPHILS # BLD AUTO: 0.03 10*3/MM3 (ref 0–0.2)
BASOPHILS NFR BLD AUTO: 0.3 % (ref 0–1.5)
BILIRUB SERPL-MCNC: 0.6 MG/DL (ref 0.2–1.2)
BUN BLD-MCNC: 8 MG/DL (ref 6–20)
BUN/CREAT SERPL: 7.9 (ref 7–25)
CALCIUM SPEC-SCNC: 8.1 MG/DL (ref 8.6–10.5)
CHLORIDE SERPL-SCNC: 102 MMOL/L (ref 98–107)
CHOLEST SERPL-MCNC: 157 MG/DL (ref 0–200)
CO2 SERPL-SCNC: 24.5 MMOL/L (ref 22–29)
CREAT BLD-MCNC: 1.01 MG/DL (ref 0.76–1.27)
DEPRECATED RDW RBC AUTO: 42.5 FL (ref 37–54)
EOSINOPHIL # BLD AUTO: 0.03 10*3/MM3 (ref 0–0.4)
EOSINOPHIL NFR BLD AUTO: 0.3 % (ref 0.3–6.2)
ERYTHROCYTE [DISTWIDTH] IN BLOOD BY AUTOMATED COUNT: 12.1 % (ref 12.3–15.4)
ERYTHROCYTE [SEDIMENTATION RATE] IN BLOOD: 18 MM/HR (ref 0–15)
GFR SERPL CREATININE-BSD FRML MDRD: 82 ML/MIN/1.73
GLOBULIN UR ELPH-MCNC: 3.3 GM/DL
GLUCOSE BLD-MCNC: 89 MG/DL (ref 65–99)
HBA1C MFR BLD: 4.6 % (ref 4.8–5.6)
HCT VFR BLD AUTO: 46.1 % (ref 37.5–51)
HDLC SERPL-MCNC: 31 MG/DL (ref 40–60)
HGB BLD-MCNC: 16.3 G/DL (ref 13–17.7)
IMM GRANULOCYTES # BLD AUTO: 0.04 10*3/MM3 (ref 0–0.05)
IMM GRANULOCYTES NFR BLD AUTO: 0.4 % (ref 0–0.5)
LDLC SERPL CALC-MCNC: 110 MG/DL (ref 0–100)
LDLC/HDLC SERPL: 3.55 {RATIO}
LYMPHOCYTES # BLD AUTO: 1.34 10*3/MM3 (ref 0.7–3.1)
LYMPHOCYTES NFR BLD AUTO: 14 % (ref 19.6–45.3)
MCH RBC QN AUTO: 33.6 PG (ref 26.6–33)
MCHC RBC AUTO-ENTMCNC: 35.4 G/DL (ref 31.5–35.7)
MCV RBC AUTO: 95.1 FL (ref 79–97)
MONOCYTES # BLD AUTO: 0.7 10*3/MM3 (ref 0.1–0.9)
MONOCYTES NFR BLD AUTO: 7.3 % (ref 5–12)
NEUTROPHILS # BLD AUTO: 7.45 10*3/MM3 (ref 1.7–7)
NEUTROPHILS NFR BLD AUTO: 77.7 % (ref 42.7–76)
NRBC BLD AUTO-RTO: 0 /100 WBC (ref 0–0.2)
NT-PROBNP SERPL-MCNC: 825.6 PG/ML (ref 5–450)
PLATELET # BLD AUTO: 207 10*3/MM3 (ref 140–450)
PMV BLD AUTO: 11.3 FL (ref 6–12)
POTASSIUM BLD-SCNC: 2.7 MMOL/L (ref 3.5–5.2)
PROT SERPL-MCNC: 6.9 G/DL (ref 6–8.5)
RBC # BLD AUTO: 4.85 10*6/MM3 (ref 4.14–5.8)
SODIUM BLD-SCNC: 137 MMOL/L (ref 136–145)
TRIGL SERPL-MCNC: 79 MG/DL (ref 0–150)
TSH SERPL DL<=0.05 MIU/L-ACNC: 3.13 MIU/ML (ref 0.27–4.2)
VLDLC SERPL-MCNC: 15.8 MG/DL (ref 5–40)
WBC NRBC COR # BLD: 9.59 10*3/MM3 (ref 3.4–10.8)

## 2019-05-14 PROCEDURE — 85025 COMPLETE CBC W/AUTO DIFF WBC: CPT | Performed by: HOSPITALIST

## 2019-05-14 PROCEDURE — 80053 COMPREHEN METABOLIC PANEL: CPT | Performed by: HOSPITALIST

## 2019-05-14 PROCEDURE — 25010000002 PIPERACILLIN SOD-TAZOBACTAM PER 1 G: Performed by: HOSPITALIST

## 2019-05-14 PROCEDURE — 80061 LIPID PANEL: CPT | Performed by: HOSPITALIST

## 2019-05-14 PROCEDURE — 96361 HYDRATE IV INFUSION ADD-ON: CPT

## 2019-05-14 PROCEDURE — 25810000003 SODIUM CHLORIDE 0.9 % WITH KCL 20 MEQ 20-0.9 MEQ/L-% SOLUTION: Performed by: HOSPITALIST

## 2019-05-14 PROCEDURE — G0378 HOSPITAL OBSERVATION PER HR: HCPCS

## 2019-05-14 PROCEDURE — 96366 THER/PROPH/DIAG IV INF ADDON: CPT

## 2019-05-14 PROCEDURE — 83880 ASSAY OF NATRIURETIC PEPTIDE: CPT | Performed by: HOSPITALIST

## 2019-05-14 PROCEDURE — 84443 ASSAY THYROID STIM HORMONE: CPT | Performed by: HOSPITALIST

## 2019-05-14 PROCEDURE — 85652 RBC SED RATE AUTOMATED: CPT | Performed by: HOSPITALIST

## 2019-05-14 PROCEDURE — 83036 HEMOGLOBIN GLYCOSYLATED A1C: CPT | Performed by: HOSPITALIST

## 2019-05-14 RX ORDER — AMLODIPINE BESYLATE 10 MG/1
10 TABLET ORAL
Qty: 30 TABLET | Refills: 0 | Status: SHIPPED | OUTPATIENT
Start: 2019-05-15 | End: 2019-06-14

## 2019-05-14 RX ORDER — AMOXICILLIN AND CLAVULANATE POTASSIUM 875; 125 MG/1; MG/1
1 TABLET, FILM COATED ORAL EVERY 12 HOURS SCHEDULED
Qty: 20 TABLET | Refills: 0 | Status: SHIPPED | OUTPATIENT
Start: 2019-05-14 | End: 2019-05-24

## 2019-05-14 RX ORDER — POTASSIUM CHLORIDE 7.45 MG/ML
10 INJECTION INTRAVENOUS
Status: DISCONTINUED | OUTPATIENT
Start: 2019-05-14 | End: 2019-05-14

## 2019-05-14 RX ORDER — AMLODIPINE BESYLATE 10 MG/1
10 TABLET ORAL ONCE
Status: DISCONTINUED | OUTPATIENT
Start: 2019-05-14 | End: 2019-05-14

## 2019-05-14 RX ORDER — AMOXICILLIN AND CLAVULANATE POTASSIUM 875; 125 MG/1; MG/1
1 TABLET, FILM COATED ORAL EVERY 12 HOURS SCHEDULED
Status: DISCONTINUED | OUTPATIENT
Start: 2019-05-14 | End: 2019-05-14 | Stop reason: HOSPADM

## 2019-05-14 RX ORDER — POTASSIUM CHLORIDE 750 MG/1
40 CAPSULE, EXTENDED RELEASE ORAL AS NEEDED
Status: DISCONTINUED | OUTPATIENT
Start: 2019-05-14 | End: 2019-05-14

## 2019-05-14 RX ORDER — POTASSIUM CHLORIDE 1.5 G/1.77G
40 POWDER, FOR SOLUTION ORAL AS NEEDED
Status: DISCONTINUED | OUTPATIENT
Start: 2019-05-14 | End: 2019-05-14

## 2019-05-14 RX ORDER — PANTOPRAZOLE SODIUM 40 MG/1
40 TABLET, DELAYED RELEASE ORAL DAILY
Qty: 30 TABLET | Refills: 0 | Status: SHIPPED | OUTPATIENT
Start: 2019-05-14 | End: 2019-06-13

## 2019-05-14 RX ADMIN — AMLODIPINE BESYLATE 10 MG: 10 TABLET ORAL at 08:19

## 2019-05-14 RX ADMIN — POTASSIUM CHLORIDE 40 MEQ: 750 CAPSULE, EXTENDED RELEASE ORAL at 06:50

## 2019-05-14 RX ADMIN — AMLODIPINE BESYLATE 10 MG: 10 TABLET ORAL at 00:42

## 2019-05-14 RX ADMIN — PANTOPRAZOLE SODIUM 40 MG: 40 TABLET, DELAYED RELEASE ORAL at 06:50

## 2019-05-14 RX ADMIN — TAZOBACTAM SODIUM AND PIPERACILLIN SODIUM 3.38 G: 375; 3 INJECTION, SOLUTION INTRAVENOUS at 08:19

## 2019-05-14 RX ADMIN — POTASSIUM CHLORIDE AND SODIUM CHLORIDE 75 ML/HR: 900; 150 INJECTION, SOLUTION INTRAVENOUS at 00:42

## 2019-05-14 RX ADMIN — POTASSIUM CHLORIDE 40 MEQ: 750 CAPSULE, EXTENDED RELEASE ORAL at 10:30

## 2019-05-14 NOTE — PROGRESS NOTES
"Daily progress note    Chief complaint  Doing much better  No new complaints  Wants to go home  Patient stated he would leave AMA if not get discharge    History of present illness  38-year-old white male with no medical problems no home medication presented to [facility] emergency room with sore throat fever nausea cough congestion for last to 3 days which is getting worse and developed difficulty swallowing food.  Patient has evaluated in urgent care center due to chills diaphoresis and was advised to return if symptoms worse.  Patient symptoms got worse with decreased appetite due to pain and trouble swallowing and developed general myalgias and evaluated in ER found to have acute severe tonsillitis with dehydration admitted for management     REVIEW OF SYSTEMS  Review of Systems   Constitutional: Positive for appetite change (decreased), chills, diaphoresis and fever. Negative for activity change.   HENT: Positive for sore throat and trouble swallowing (due to pain). Negative for congestion.    Eyes: Negative.    Respiratory: Positive for shortness of breath. Negative for cough.    Cardiovascular: Negative for chest pain and leg swelling.        (+) HTN   Gastrointestinal: Positive for diarrhea and nausea. Negative for abdominal pain and vomiting.   Endocrine: Negative.    Genitourinary: Negative for decreased urine volume and dysuria.   Musculoskeletal: Positive for myalgias (generalized). Negative for neck pain.   Skin: Negative for rash and wound.   Allergic/Immunologic: Negative.    Neurological: Negative for weakness, numbness and headaches.   Hematological: Negative.    Psychiatric/Behavioral: Negative.    All other systems reviewed and are negative.     PHYSICAL EXAM  Blood pressure (!) 152/102, pulse 102, temperature 98.5 °F (36.9 °C), temperature source Oral, resp. rate 18, height 177.8 cm (70\"), weight 50.4 kg (111 lb 3.2 oz), SpO2 93 %.    Constitutional: He is oriented to person, place, and " time.  Non-toxic appearance. No distress.   Head: Normocephalic and atraumatic.   Mouth/Throat: No trismus in the jaw. Oropharyngeal exudate present. No tonsillar abscesses.   Patient has significantly enlarged palatine tonsils that almost meet in the midline.  The uvula is midline.  She has exudate on the left palate team tonsil greater than the right.  It is a mild amount of exudate.  Patient has no trismus.  Patient is tolerating oral secretions well.  Patient has no pain in moving the trachea or hyoid bone with palpation.   Eyes: EOM are normal. Pupils are equal, round, and reactive to light.   Neck: Normal range of motion. Neck supple. No tracheal tenderness present. No tracheal deviation present.   Cardiovascular: Normal rate, regular rhythm, normal heart sounds and intact distal pulses.   Pulmonary/Chest: Effort normal and breath sounds normal. No stridor. No respiratory distress.   Abdominal: Soft. There is no tenderness. There is no rebound and no guarding.   Musculoskeletal: Normal range of motion. He exhibits no edema.    Lymphadenopathy: Head (right side): Submandibular adenopathy present. Head (left side): Submandibular adenopathy present.   Neurological: He is alert and oriented to person, place, and time. He has normal sensation and normal strength. No cranial nerve deficit.   Skin: Skin is warm and dry.   Psychiatric: Mood and affect normal.     LAB RESULTS  Lab Results (last 24 hours)     Procedure Component Value Units Date/Time    Beta Strep Culture, Throat - Swab, Throat [[id number]]  (Normal) Collected:  05/12/19 1550    Specimen:  Swab from Throat Updated:  05/14/19 1047     Throat Culture, Beta Strep No Beta Hemolytic Streptococcus Isolated    Narrative:       Group A Strep incidence is low in adults. Positive culture for Beta hemolytic Streptococcus species can reflect colonization and not true infection. Please correlate clinically.  Group A Strep incidence is low in adults. Positive culture  for Beta hemolytic Streptococcus species can reflect colonization and not true infection. Please correlate clinically.    Sedimentation Rate [[id number]]  (Abnormal) Collected:  05/14/19 0437    Specimen:  Blood Updated:  05/14/19 0719     Sed Rate 18 mm/hr     BNP [[id number]]  (Abnormal) Collected:  05/14/19 0437    Specimen:  Blood Updated:  05/14/19 0552     proBNP 825.6 pg/mL     Narrative:       Among patients with dyspnea, NT-proBNP is highly sensitive for the detection of acute congestive heart failure. In addition NT-proBNP of <300 pg/ml effectively rules out acute congestive heart failure with 99% negative predictive value.    TSH [[id number]]  (Normal) Collected:  05/14/19 0437    Specimen:  Blood Updated:  05/14/19 0552     TSH 3.130 mIU/mL     Comprehensive Metabolic Panel [[id number]]  (Abnormal) Collected:  05/14/19 0437    Specimen:  Blood Updated:  05/14/19 0546     Glucose 89 mg/dL      BUN 8 mg/dL      Creatinine 1.01 mg/dL      Sodium 137 mmol/L      Potassium 2.7 mmol/L      Chloride 102 mmol/L      CO2 24.5 mmol/L      Calcium 8.1 mg/dL      Total Protein 6.9 g/dL      Albumin 3.60 g/dL      ALT (SGPT) 21 U/L      AST (SGOT) 15 U/L      Alkaline Phosphatase 46 U/L      Total Bilirubin 0.6 mg/dL      eGFR Non African Amer 82 mL/min/1.73      Globulin 3.3 gm/dL      A/G Ratio 1.1 g/dL      BUN/Creatinine Ratio 7.9     Anion Gap 10.5 mmol/L     Narrative:       GFR Normal >60  Chronic Kidney Disease <60  Kidney Failure <15    Lipid Panel [[id number]]  (Abnormal) Collected:  05/14/19 0437    Specimen:  Blood Updated:  05/14/19 0546     Total Cholesterol 157 mg/dL      Triglycerides 79 mg/dL      HDL Cholesterol 31 mg/dL      LDL Cholesterol  110 mg/dL      VLDL Cholesterol 15.8 mg/dL      LDL/HDL Ratio 3.55    Narrative:       Cholesterol Reference Ranges  (U.S. Department of Health and Human Services ATP III Classifications)    Desirable          <200 mg/dL  Borderline High    200-239 mg/dL  High Risk           >240 mg/dL      Triglyceride Reference Ranges  (U.S. Department of Health and Human Services ATP III Classifications)    Normal           <150 mg/dL  Borderline High  150-199 mg/dL  High             200-499 mg/dL  Very High        >500 mg/dL    HDL Reference Ranges  (U.S. Department of Health and Human Services ATP III Classifcations)    Low     <40 mg/dl (major risk factor for CHD)  High    >60 mg/dl ('negative' risk factor for CHD)        LDL Reference Ranges  (U.S. Department of Health and Human Services ATP III Classifcations)    Optimal          <100 mg/dL  Near Optimal     100-129 mg/dL  Borderline High  130-159 mg/dL  High             160-189 mg/dL  Very High        >189 mg/dL    Hemoglobin A1c [[id number]]  (Abnormal) Collected:  05/14/19 0437    Specimen:  Blood Updated:  05/14/19 0532     Hemoglobin A1C 4.60 %     Narrative:       Hemoglobin A1C Ranges:    Increased Risk for Diabetes  5.7% to 6.4%  Diabetes                     >= 6.5%  Diabetic Goal                < 7.0%    CBC & Differential [[id number]] Collected:  05/14/19 0437    Specimen:  Blood Updated:  05/14/19 0523    Narrative:       The following orders were created for panel order CBC & Differential.  Procedure                               Abnormality         Status                     ---------                               -----------         ------                     CBC Auto Differential[[id number]]        Abnormal            Final result                 Please view results for these tests on the individual orders.    CBC Auto Differential [[id number]]  (Abnormal) Collected:  05/14/19 0437    Specimen:  Blood Updated:  05/14/19 0523     WBC 9.59 10*3/mm3      RBC 4.85 10*6/mm3      Hemoglobin 16.3 g/dL      Hematocrit 46.1 %      MCV 95.1 fL      MCH 33.6 pg      MCHC 35.4 g/dL      RDW 12.1 %      RDW-SD 42.5 fl      MPV 11.3 fL      Platelets 207 10*3/mm3      Neutrophil % 77.7 %      Lymphocyte % 14.0 %      Monocyte % 7.3 %       Eosinophil % 0.3 %      Basophil % 0.3 %      Immature Grans % 0.4 %      Neutrophils, Absolute 7.45 10*3/mm3      Lymphocytes, Absolute 1.34 10*3/mm3      Monocytes, Absolute 0.70 10*3/mm3      Eosinophils, Absolute 0.03 10*3/mm3      Basophils, Absolute 0.03 10*3/mm3      Immature Grans, Absolute 0.04 10*3/mm3      nRBC 0.0 /100 WBC     Blood Culture - Blood, Arm, Left [[id number]] Collected:  05/12/19 1651    Specimen:  Blood from Arm, Left Updated:  05/13/19 1715     Blood Culture No growth at 24 hours    Blood Culture - Blood, Arm, Right [[id number]] Collected:  05/12/19 1659    Specimen:  Blood from Arm, Right Updated:  05/13/19 1715     Blood Culture No growth at 24 hours    Respiratory Panel, PCR - Swab, Nasopharynx [[id number]]  (Normal) Collected:  05/13/19 1447    Specimen:  Swab from Nasopharynx Updated:  05/13/19 1556     ADENOVIRUS, PCR Not Detected     Coronavirus 229E Not Detected     Coronavirus HKU1 Not Detected     Coronavirus NL63 Not Detected     Coronavirus OC43 Not Detected     Human Metapneumovirus Not Detected     Human Rhinovirus/Enterovirus Not Detected     Influenza B PCR Not Detected     Parainfluenza Virus 1 Not Detected     Parainfluenza Virus 2 Not Detected     Parainfluenza Virus 3 Not Detected     Parainfluenza Virus 4 Not Detected     Bordetella pertussis pcr Not Detected     Influenza A H1 2009 PCR Not Detected     Chlamydophila pneumoniae PCR Not Detected     Mycoplasma pneumo by PCR Not Detected     Influenza A PCR Not Detected     Influenza A H3 Not Detected     Influenza A H1 Not Detected     RSV, PCR Not Detected     Bordetella parapertussis PCR Not Detected    C-reactive Protein [[id number]]  (Abnormal) Collected:  05/13/19 1342    Specimen:  Blood Updated:  05/13/19 1424     C-Reactive Protein 4.66 mg/dL         Imaging Results (last 24 hours)     Procedure Component Value Units Date/Time    CT Soft Tissue Neck With Contrast [[id number]] Collected:  05/12/19 1931     Updated:   05/13/19 1654    Narrative:       EMERGENCY CONTRAST-ENHANCED CT SCAN OF THE NECK 05/12/2019     CLINICAL HISTORY: Sore throat and fever.     TECHNIQUE: Spiral CT images were obtained from the tops of petrous  apices down through the sternoclavicular junction following intravenous  contrast and images were reformatted and submitted in 3 mm thick axial  CT sections with soft tissue algorithm, 2 mm thick sagittal and coronal  reconstructions were performed and submitted in soft tissue algorithm.     There are no prior studies from [facility] for  comparison.     FINDINGS: There is a small 20 x 12 x 15 mm mucous retention cyst in the  inferior left maxillary sinus and a 10 x 10 mm mucus retention cyst in  inferior right maxillary sinus. The remainder of the visualized  paranasal sinuses and mastoid air cells and middle ear cavities are  clear. The posterior fossa structures are unremarkable. The nasopharynx  is normal in appearance. There is mild prominence in size, palatine  tonsils. There is slight increased enhancement in the palatine and  lingual tonsils, may be mildly inflamed but there is no abscess.  Otherwise the oropharynx is normal in appearance. There is a right  lateral pharyngeal diverticulum measuring 12 x 12 mm in size that abuts  the right hyoid bone. The remainder of the hypopharynx true cord  subglottic airway are normal in appearance. The lung apices are clear.  The parotid,  parapharyngeal spaces are symmetric and normal  in appearance. The submandibular glands are normal in appearance. There  is lymphadenopathy in the neck with multiple enlarged suprahyoid level 2  bilateral jugulodigastric chain lymph nodes. The largest left  jugulodigastric chain lymph node measures up to 22 x 23 mm in transverse  dimension. The largest right jugulodigastric chain lymph node measures  19 x 15 mm. Cervical spine is unremarkable.       Impression:       Prominent size palatine-falcial  tonsils that have increased  enhancement within them suggesting they are mildly inflamed. No  tonsillar abscess is seen and there is lymphadenopathy in neck with  multiple enlarged bilateral level 2 suprahyoid jugulodigastric chain  lymph nodes. The largest single lymph node in this left suprahyoid  jugulodigastric chain measures up to 2.3 x 2.2 x 3.5 cm in size. These  are most probably benign reactive lymph nodes that are inflammatory in  origin and very unlikely to be neoplastic especially given the inflamed  tonsils and the clinical symptoms of fever and sore throat but of coarse  the findings need to be correlated clinically and with serial clinical  exam and if they do not decrease in size over short period time of  follow-up neck CT with contrast warranted to reevaluate.. The results of  this study were communicated to Dr. Gann in the emergency room by  telephone 05/12/2019 at 6:15 PM.     Radiation dose reduction techniques were utilized, including automated  exposure control and exposure modulation based on body size.     This report was finalized on 5/13/2019 4:51 PM by Dr. Kevin Johnson M.D.             Current Facility-Administered Medications:   •  amLODIPine (NORVASC) tablet 10 mg, 10 mg, Oral, Q24H, Jeannie Berman MD, 10 mg at 05/14/19 0819  •  amoxicillin-clavulanate (AUGMENTIN) 875-125 MG per tablet 1 tablet, 1 tablet, Oral, Q12H, Jeannie Berman MD  •  pantoprazole (PROTONIX) EC tablet 40 mg, 40 mg, Oral, Q AM, Jeannie Berman MD, 40 mg at 05/14/19 0650     ASSESSMENT  Acute severe tonsillitis without abscess  Dehydration  Dysphagia and odynophagia  Hypertension  Gastroesophageal reflux disease    PLAN  Discharge home on oral antibiotics for 10 days  Discharge summary dictated    JEANNIE BERMAN MD

## 2019-05-14 NOTE — PLAN OF CARE
Problem: Patient Care Overview  Goal: Plan of Care Review  Outcome: Ongoing (interventions implemented as appropriate)   05/14/19 5988   Coping/Psychosocial   Plan of Care Reviewed With patient   Plan of Care Review   Progress improving   OTHER   Outcome Summary Patients BP still running high. PRN Hydralazine given at the beginning of the night. Patient c/o SOA after medication given and weakness. New order received to d/c Hydralazine and increase Norvasc from 5mg to 10mg daily. Throat still red and swollen. IV abx given as ordered. Possible d/c today. Will continue to monitor.        Problem: Pain, Acute (Adult)  Goal: Acceptable Pain Control/Comfort Level  Outcome: Ongoing (interventions implemented as appropriate)      Problem: Infection, Risk/Actual (Adult)  Goal: Infection Prevention/Resolution  Outcome: Ongoing (interventions implemented as appropriate)

## 2019-05-14 NOTE — DISCHARGE SUMMARY
Discharge summary    Date of admission 5/12/2019  Date of discharge 5/14/2019    Final diagnosis  Acute severe tonsillitis without abscess  Dehydration  Dysphagia and odynophagia  Hypertension  Gastroesophageal reflux disease    Discharge medications    Current Facility-Administered Medications:   •  amLODIPine (NORVASC) tablet 10 mg, 10 mg, Oral, Q24H, Jeannie Berman MD, 10 mg at 05/14/19 0819  •  amoxicillin-clavulanate (AUGMENTIN) 875-125 MG per tablet 1 tablet, 1 tablet, Oral, Q12H, Jeannie Berman MD  •  pantoprazole (PROTONIX) EC tablet 40 mg, 40 mg, Oral, Q AM, Jeannie Berman MD, 40 mg at 05/14/19 0650     Consult obtain  ENT    Procedures  None    Hospital course  38-year-old white male with no medical problems no home medication admitted to emergency room with sore throat cough congestion nausea for last 2 days.  Patient evaluated found to have severe tonsillitis admit for management.  Patient also found to have high blood pressure.  Patient was dehydrated.  Patient admitted treated with IV Zosyn and ENT consult obtained and did recommend 10 days of oral antibiotics.  Patient blood pressure stabilized with Norvasc.  Patient started with liquid diet and advance as tolerated nausea tolerating regular diet.  Recent blood pressure still slightly on higher side but he said he will leave AMA if not get discharge.  I will discharge him on oral antibiotics for 10 days with close follow-up with ENT.    Discharge diet regular    Activity as tolerated    Medication as above    Follow-up with primary doctor in 1 week and follow-up with ENT per the instruction and take medication as directed    JEANNIE BERMAN MD

## 2019-05-14 NOTE — PROGRESS NOTES
Case Management Discharge Note    Final Note: Left AMA    Destination      No service has been selected for the patient.      Durable Medical Equipment      No service has been selected for the patient.      Dialysis/Infusion      No service has been selected for the patient.      Home Medical Care      No service has been selected for the patient.      Therapy      No service has been selected for the patient.      Community Resources      No service has been selected for the patient.             Final Discharge Disposition Code: 07 - left AMA

## 2019-05-17 LAB
BACTERIA SPEC AEROBE CULT: NORMAL
BACTERIA SPEC AEROBE CULT: NORMAL

## 2021-03-31 ENCOUNTER — BULK ORDERING (OUTPATIENT)
Dept: CASE MANAGEMENT | Facility: OTHER | Age: 41
End: 2021-03-31

## 2021-03-31 DIAGNOSIS — Z23 IMMUNIZATION DUE: ICD-10-CM
